# Patient Record
Sex: FEMALE | Race: WHITE | NOT HISPANIC OR LATINO | Employment: PART TIME | URBAN - METROPOLITAN AREA
[De-identification: names, ages, dates, MRNs, and addresses within clinical notes are randomized per-mention and may not be internally consistent; named-entity substitution may affect disease eponyms.]

---

## 2017-06-20 ENCOUNTER — OFFICE VISIT (OUTPATIENT)
Dept: URGENT CARE | Facility: CLINIC | Age: 27
End: 2017-06-20
Payer: COMMERCIAL

## 2017-06-20 VITALS
DIASTOLIC BLOOD PRESSURE: 72 MMHG | WEIGHT: 121.6 LBS | OXYGEN SATURATION: 100 % | RESPIRATION RATE: 16 BRPM | BODY MASS INDEX: 22.38 KG/M2 | HEIGHT: 62 IN | SYSTOLIC BLOOD PRESSURE: 112 MMHG | TEMPERATURE: 97.9 F | HEART RATE: 80 BPM

## 2017-06-20 DIAGNOSIS — R59.0 CERVICAL ADENOPATHY: ICD-10-CM

## 2017-06-20 PROCEDURE — 99203 OFFICE O/P NEW LOW 30 MIN: CPT | Performed by: FAMILY MEDICINE

## 2017-06-20 RX ORDER — PREDNISOLONE ACETATE 10 MG/ML
1 SUSPENSION/ DROPS OPHTHALMIC 4 TIMES DAILY
COMMUNITY
End: 2022-05-19

## 2017-06-20 RX ORDER — DORZOLAMIDE HCL 20 MG/ML
1 SOLUTION/ DROPS OPHTHALMIC 3 TIMES DAILY
COMMUNITY
End: 2018-01-05

## 2017-06-20 ASSESSMENT — ENCOUNTER SYMPTOMS
MYALGIAS: 0
HEADACHES: 0
NAUSEA: 0
ARTHRALGIAS: 0
ABDOMINAL PAIN: 0
SWOLLEN GLANDS: 1
VOMITING: 0
COUGH: 0
FEVER: 0
ANOREXIA: 0
CHILLS: 0
NECK PAIN: 0
VISUAL CHANGE: 0
FATIGUE: 0
SORE THROAT: 0
CHANGE IN BOWEL HABIT: 0
DIZZINESS: 0

## 2017-06-20 NOTE — MR AVS SNAPSHOT
"        Reji Connolly   2017 6:15 PM   Office Visit   MRN: [id number]    Department:  [facility]   Dept Phone:  [phone]    Description:  Female : 1990   Provider:  Jeffery Barnhart M.D.           Reason for Visit     Other x 1 week have swollen lymphnodes, just hurts if she pushes on them, no ST, no cold symptoms       Allergies as of 2017     Allergen Noted Reactions    Methotrexate 2009       Prednisone 2009         Vital Signs     Blood Pressure Pulse Temperature Respirations Height Weight    112/72 mmHg 80 36.6 °C (97.9 °F) 16 1.575 m (5' 2\") 55.157 kg (121 lb 9.6 oz)    Body Mass Index Oxygen Saturation                22.24 kg/m2 100%          Basic Information     Date Of Birth Sex Race Ethnicity Preferred Language    1990 Female White Non- English      Health Maintenance        Date Due Completion Dates    IMM HEP B VACCINE (1 of 3 - Primary Series) 1990 ---    IMM HEP A VACCINE (1 of 2 - Standard Series) 10/16/1991 ---    IMM HPV VACCINE (1 of 3 - Female 3 Dose Series) 10/16/2001 ---    IMM VARICELLA (CHICKENPOX) VACCINE (1 of 2 - 2 Dose Adolescent Series) 10/16/2003 ---    IMM DTaP/Tdap/Td Vaccine (1 - Tdap) 10/16/2009 ---    PAP SMEAR 10/16/2011 ---            Current Immunizations     No immunizations on file.      Below and/or attached are the medications your provider expects you to take. Review all of your home medications and newly ordered medications with your provider and/or pharmacist. Follow medication instructions as directed by your provider and/or pharmacist. Please keep your medication list with you and share with your provider. Update the information when medications are discontinued, doses are changed, or new medications (including over-the-counter products) are added; and carry medication information at all times in the event of emergency situations     Allergies:  METHOTREXATE - (reactions not documented)     PREDNISONE " - (reactions not documented)               Medications  Valid as of: June 20, 2017 -  6:45 PM    Generic Name Brand Name Tablet Size Instructions for use    Brimonidine Tartrate-Timolol   by Ophthalmic route.        Dorzolamide HCl (Solution) TRUSOPT 2 % Place 1 Drop in both eyes 3 times a day.        Ketorolac Tromethamine   by Ophthalmic route.        PrednisoLONE Acetate (Suspension) PRED FORTE 1 % Place 1 Drop in both eyes 4 times a day.        Triamcinolone Acetonide (Cream) KENALOG 0.5 % Apply to skin twice a day, sparingly        .                 Medicines prescribed today were sent to:     [facility] [address], [place] - [address] AT [facility] [address]    [address] [place] [address]    Phone: [phone] Fax: [phone]    Open 24 Hours?: No      Medication refill instructions:       If your prescription bottle indicates you have medication refills left, it is not necessary to call your provider’s office. Please contact your pharmacy and they will refill your medication.    If your prescription bottle indicates you do not have any refills left, you may request refills at any time through one of the following ways: The online [employer] system (except Urgent Care), by calling your provider’s office, or by asking your pharmacy to contact your provider’s office with a refill request. Medication refills are processed only during regular business hours and may not be available until the next business day. Your provider may request additional information or to have a follow-up visit with you prior to refilling your medication.   *Please Note: Medication refills are assigned a new Rx number when refilled electronically. Your pharmacy may indicate that no refills were authorized even though a new prescription for the same medication is available at the pharmacy. Please request the medicine by name with the pharmacy before contacting your provider for a refill.            Adventoris Access Code: [email or web address]  Expires: 7/20/2017  6:45 PM    Adventoris  A secure, online tool to manage your health information     [employer]’s Adventoris® is a secure, online tool that connects you to your personalized health information from the privacy of your home -- day or night - making it very easy for you to manage your healthcare. Once the activation process is completed, you can even access your medical information using the Adventoris sayra, which is available for free in the Apple Sayra store or Google Play store.     Adventoris provides the following levels of access (as shown below):   My Chart Features   [facility] Primary Care Doctor [facility]  Specialists [facility]  Urgent  Care Non-[facility]  Primary Care  Doctor   Email your healthcare team securely and privately 24/7 X X X    Manage appointments: schedule your next appointment; view details of past/upcoming appointments X      Request prescription refills. X      View recent personal medical records, including lab and immunizations X X X X   View health record, including health history, allergies, medications X X X X   Read reports about your outpatient visits, procedures, consult and ER notes X X X X   See your discharge summary, which is a recap of your hospital and/or ER visit that includes your diagnosis, lab results, and care plan. X X       How to register for Adventoris:  1. Go to  [email or web address].  2. Click on the Sign Up Now box, which takes you to the New Member Sign Up page. You will need to provide the following information:  a. Enter your Adventoris Access Code exactly as it appears at the top of this page. (You will not need to use this code after you’ve completed the sign-up process. If you do not sign up before the expiration date, you must request a new code.)   b. Enter your date of birth.   c. Enter your home email address.   d. Click Submit, and follow the next screen’s instructions.  3. Create a Adventoris ID. This will be your Adventoris  login ID and cannot be changed, so think of one that is secure and easy to remember.  4. Create a [employer] password. You can change your password at any time.  5. Enter your Password Reset Question and Answer. This can be used at a later time if you forget your password.   6. Enter your e-mail address. This allows you to receive e-mail notifications when new information is available in [employer].  7. Click Sign Up. You can now view your health information.    For assistance activating your [employer] account, call [phone]

## 2017-06-21 NOTE — PROGRESS NOTES
Subjective:      Reji Connolly is a 26 y.o. female who presents with Other            Other  This is a new problem. The current episode started in the past 7 days (4-5 days). The problem occurs intermittently. The problem has been unchanged. Associated symptoms include swollen glands. Pertinent negatives include no abdominal pain, anorexia, arthralgias, change in bowel habit, chest pain, chills, congestion, coughing, fatigue, fever, headaches, myalgias, nausea, neck pain, rash, sore throat, urinary symptoms, visual change or vomiting. Exacerbated by: movement. She has tried NSAIDs (aleve) for the symptoms. The treatment provided mild relief.       Review of Systems   Constitutional: Negative for fever, chills and fatigue.   HENT: Negative for congestion and sore throat.    Respiratory: Negative for cough.    Cardiovascular: Negative for chest pain.   Gastrointestinal: Negative for nausea, vomiting, abdominal pain, anorexia and change in bowel habit.   Musculoskeletal: Negative for myalgias, arthralgias and neck pain.   Skin: Negative for rash.   Neurological: Negative for dizziness and headaches.     PMH:  has no past medical history on file.  MEDS:   Current outpatient prescriptions:   •  dorzolamide (TRUSOPT) 2 % Solution, Place 1 Drop in both eyes 3 times a day., Disp: , Rfl:   •  prednisoLONE acetate (PRED FORTE) 1 % Suspension, Place 1 Drop in both eyes 4 times a day., Disp: , Rfl:   •  Brimonidine Tartrate-Timolol (COMBIGAN OP), by Ophthalmic route., Disp: , Rfl:   •  KETOROLAC TROMETHAMINE OP, by Ophthalmic route., Disp: , Rfl:   •  triamcinolone acetonide (KENALOG) 0.5 % CREA, Apply to skin twice a day, sparingly, Disp: 45 g, Rfl: 0  ALLERGIES:   Allergies   Allergen Reactions   • Methotrexate    • Prednisone      SURGHX:   Past Surgical History   Procedure Laterality Date   • Eye surgery       cataract in right eye     SOCHX:  reports that she has never smoked. She does not have any smokeless tobacco  "history on file. She reports that she does not drink alcohol or use illicit drugs.  FH: Family history was reviewed, no pertinent findings to report       Objective:     /72 mmHg  Pulse 80  Temp(Src) 36.6 °C (97.9 °F)  Resp 16  Ht 1.575 m (5' 2\")  Wt 55.157 kg (121 lb 9.6 oz)  BMI 22.24 kg/m2  SpO2 100%     Physical Exam   Constitutional: She appears well-developed.   HENT:   Head: Normocephalic.   Right Ear: External ear normal.   Left Ear: External ear normal.   Mouth/Throat: No oropharyngeal exudate.   Mild Nasal congestion    Eyes: Right eye exhibits no discharge. Left eye exhibits no discharge.   Neck: Normal range of motion. No tracheal deviation present. No thyromegaly present.   Cardiovascular: Normal rate.  Exam reveals no friction rub.    No murmur heard.  Pulmonary/Chest: Effort normal. No stridor. No respiratory distress. She has no wheezes.   Abdominal: Soft. She exhibits no distension. There is no tenderness. There is no guarding.   Lymphadenopathy:     She has cervical adenopathy.   Neurological: She is alert.   Skin: Skin is warm and dry. She is not diaphoretic.   Psychiatric: She has a normal mood and affect. Her behavior is normal.               Assessment/Plan:     1. Cervical adenopathy       Improving  Recent laryngitis, mother also has viral upper respiratory infection  Suspect this is related to her recent infection  Continue ibuprofen for the next few days  Follow adenopathy, already improving  Symptoms persist or worsen beyond the next few weeks she should be reevaluated  "

## 2017-09-07 ENCOUNTER — OFFICE VISIT (OUTPATIENT)
Dept: URGENT CARE | Facility: CLINIC | Age: 27
End: 2017-09-07
Payer: COMMERCIAL

## 2017-09-07 VITALS
TEMPERATURE: 98.7 F | SYSTOLIC BLOOD PRESSURE: 114 MMHG | OXYGEN SATURATION: 99 % | HEIGHT: 62 IN | BODY MASS INDEX: 23.55 KG/M2 | DIASTOLIC BLOOD PRESSURE: 70 MMHG | HEART RATE: 104 BPM | WEIGHT: 128 LBS

## 2017-09-07 DIAGNOSIS — S16.1XXA NECK STRAIN, INITIAL ENCOUNTER: ICD-10-CM
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
## 2017-09-07 PROCEDURE — 99214 OFFICE O/P EST MOD 30 MIN: CPT | Performed by: PHYSICIAN ASSISTANT

## 2017-09-07 RX ORDER — CYCLOBENZAPRINE HCL 5 MG
5-10 TABLET ORAL 3 TIMES DAILY PRN
Qty: 20 TAB | Refills: 0 | Status: SHIPPED | OUTPATIENT
Start: 2017-09-07 | End: 2018-01-05

## 2017-09-07 RX ORDER — PILOCARPINE HYDROCHLORIDE 10 MG/ML
1 SOLUTION/ DROPS OPHTHALMIC 4 TIMES DAILY
COMMUNITY
End: 2022-05-19

## 2017-09-08 ASSESSMENT — ENCOUNTER SYMPTOMS
ABDOMINAL PAIN: 0
TROUBLE SWALLOWING: 0
NAUSEA: 0
SYNCOPE: 0
VISUAL CHANGE: 0
DIARRHEA: 0
WEAKNESS: 0
SORE THROAT: 0
COUGH: 0
HEADACHES: 0
CHILLS: 0
VOMITING: 0
PHOTOPHOBIA: 0
WHEEZING: 0
LEG PAIN: 0
BACK PAIN: 0
NUMBNESS: 0
EYE DISCHARGE: 0
FEVER: 0
FALLS: 0
EYE REDNESS: 0
NECK PAIN: 1
TINGLING: 0
SENSORY CHANGE: 0
PARESIS: 0
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
## 2017-12-22 ENCOUNTER — APPOINTMENT (OUTPATIENT)
Dept: MEDICAL GROUP | Facility: MEDICAL CENTER | Age: 27
End: 2017-12-22
Payer: COMMERCIAL

## 2018-01-05 ENCOUNTER — OFFICE VISIT (OUTPATIENT)
Dept: MEDICAL GROUP | Facility: MEDICAL CENTER | Age: 28
End: 2018-01-05
Payer: COMMERCIAL

## 2018-01-05 ENCOUNTER — HOSPITAL ENCOUNTER (OUTPATIENT)
Dept: RADIOLOGY | Facility: MEDICAL CENTER | Age: 28
End: 2018-01-05
Attending: NURSE PRACTITIONER
Payer: COMMERCIAL

## 2018-01-05 VITALS
RESPIRATION RATE: 16 BRPM | HEIGHT: 62 IN | OXYGEN SATURATION: 95 % | WEIGHT: 131 LBS | TEMPERATURE: 97.4 F | HEART RATE: 88 BPM | BODY MASS INDEX: 24.11 KG/M2 | SYSTOLIC BLOOD PRESSURE: 122 MMHG | DIASTOLIC BLOOD PRESSURE: 70 MMHG

## 2018-01-05 DIAGNOSIS — M54.50 ACUTE MIDLINE LOW BACK PAIN WITHOUT SCIATICA: ICD-10-CM

## 2018-01-05 DIAGNOSIS — G93.89 MASS OF BRAIN: ICD-10-CM

## 2018-01-05 DIAGNOSIS — H20.9 UVEITIS: ICD-10-CM

## 2018-01-05 PROCEDURE — 72100 X-RAY EXAM L-S SPINE 2/3 VWS: CPT

## 2018-01-05 PROCEDURE — 99214 OFFICE O/P EST MOD 30 MIN: CPT | Performed by: NURSE PRACTITIONER

## 2018-01-05 RX ORDER — NORGESTIMATE AND ETHINYL ESTRADIOL 0.25-0.035
1 KIT ORAL DAILY
Qty: 28 TAB | Status: SHIPPED | DISCHARGE
Start: 2018-01-05 | End: 2022-05-19

## 2018-01-05 ASSESSMENT — ENCOUNTER SYMPTOMS: BACK PAIN: 1

## 2018-01-05 ASSESSMENT — PATIENT HEALTH QUESTIONNAIRE - PHQ9: CLINICAL INTERPRETATION OF PHQ2 SCORE: 0
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
## 2018-01-17 ENCOUNTER — TELEPHONE (OUTPATIENT)
Dept: MEDICAL GROUP | Facility: MEDICAL CENTER | Age: 28
End: 2018-01-17

## 2018-01-17 DIAGNOSIS — Z00.00 ROUTINE GENERAL MEDICAL EXAMINATION AT A HEALTH CARE FACILITY: ICD-10-CM

## 2018-01-17 NOTE — TELEPHONE ENCOUNTER
1. Caller Name: Reji Connolly                        Call Back Number: [phone]      2. Message: Patient called, she said you ordered some lab work for her and was wondering if you can add to that lab work an order to check her vitamin levels, to see which one she needs to take and to be faxed to [phone] if ordered...thank you    3. Patient approves office to leave a detailed voicemail/MyChart message: N\A

## 2018-01-17 NOTE — TELEPHONE ENCOUNTER
I have ordered vitamin D and vitamin B12 levels which are typically the only vitamin levels that we would check since most vitamin levels are otherwise normal in [place]. Also, I cannot guarantee her insurance will play for routine screening and she should check with her insurance before she has these tests done.

## 2018-01-20 LAB
25(OH)D3+25(OH)D2 SERPL-MCNC: 54.7 NG/ML (ref 30–100)
ALBUMIN SERPL-MCNC: 4.8 G/DL (ref 3.5–5.5)
ALBUMIN/GLOB SERPL: 1.8 {RATIO} (ref 1.2–2.2)
ALP SERPL-CCNC: 44 IU/L (ref 39–117)
ALT SERPL-CCNC: 17 IU/L (ref 0–32)
AST SERPL-CCNC: 28 IU/L (ref 0–40)
BASOPHILS # BLD AUTO: 0 X10E3/UL (ref 0–0.2)
BASOPHILS NFR BLD AUTO: 0 %
BILIRUB SERPL-MCNC: 0.6 MG/DL (ref 0–1.2)
BUN SERPL-MCNC: 10 MG/DL (ref 6–20)
BUN/CREAT SERPL: 15 (ref 9–23)
CALCIUM SERPL-MCNC: 10.2 MG/DL (ref 8.7–10.2)
CHLORIDE SERPL-SCNC: 100 MMOL/L (ref 96–106)
CO2 SERPL-SCNC: 23 MMOL/L (ref 18–29)
CREAT SERPL-MCNC: 0.67 MG/DL (ref 0.57–1)
EOSINOPHIL # BLD AUTO: 0 X10E3/UL (ref 0–0.4)
EOSINOPHIL NFR BLD AUTO: 1 %
ERYTHROCYTE [DISTWIDTH] IN BLOOD BY AUTOMATED COUNT: 12.7 % (ref 12.3–15.4)
GLOBULIN SER CALC-MCNC: 2.7 G/DL (ref 1.5–4.5)
GLUCOSE SERPL-MCNC: 87 MG/DL (ref 65–99)
HCT VFR BLD AUTO: 39.9 % (ref 34–46.6)
HGB BLD-MCNC: 13.8 G/DL (ref 11.1–15.9)
IF AFRICAN AMERICAN  100797: 139 ML/MIN/1.73
IF NON AFRICAN AMER 100791: 121 ML/MIN/1.73
IMM GRANULOCYTES # BLD: 0 X10E3/UL (ref 0–0.1)
IMM GRANULOCYTES NFR BLD: 0 %
IMMATURE CELLS  115398: NORMAL
LYMPHOCYTES # BLD AUTO: 1.6 X10E3/UL (ref 0.7–3.1)
LYMPHOCYTES NFR BLD AUTO: 44 %
MCH RBC QN AUTO: 31.5 PG (ref 26.6–33)
MCHC RBC AUTO-ENTMCNC: 34.6 G/DL (ref 31.5–35.7)
MCV RBC AUTO: 91 FL (ref 79–97)
MONOCYTES # BLD AUTO: 0.3 X10E3/UL (ref 0.1–0.9)
MONOCYTES NFR BLD AUTO: 8 %
MORPHOLOGY BLD-IMP: NORMAL
NEUTROPHILS # BLD AUTO: 1.7 X10E3/UL (ref 1.4–7)
NEUTROPHILS NFR BLD AUTO: 47 %
NRBC BLD AUTO-RTO: NORMAL %
PLATELET # BLD AUTO: 242 X10E3/UL (ref 150–379)
POTASSIUM SERPL-SCNC: 4.6 MMOL/L (ref 3.5–5.2)
PROT SERPL-MCNC: 7.5 G/DL (ref 6–8.5)
RBC # BLD AUTO: 4.38 X10E6/UL (ref 3.77–5.28)
SODIUM SERPL-SCNC: 138 MMOL/L (ref 134–144)
VIT B12 SERPL-MCNC: 283 PG/ML (ref 232–1245)
WBC # BLD AUTO: 3.7 X10E3/UL (ref 3.4–10.8)

## 2018-01-23 ENCOUNTER — HOSPITAL ENCOUNTER (OUTPATIENT)
Dept: RADIOLOGY | Facility: MEDICAL CENTER | Age: 28
End: 2018-01-23
Attending: NURSE PRACTITIONER
Payer: COMMERCIAL

## 2018-01-23 ENCOUNTER — PHYSICAL THERAPY (OUTPATIENT)
Dept: PHYSICAL THERAPY | Facility: REHABILITATION | Age: 28
End: 2018-01-23
Attending: NURSE PRACTITIONER
Payer: COMMERCIAL

## 2018-01-23 VITALS — BODY MASS INDEX: 23.37 KG/M2 | WEIGHT: 127 LBS | HEIGHT: 62 IN

## 2018-01-23 DIAGNOSIS — M54.50 ACUTE LOW BACK PAIN WITHOUT SCIATICA, UNSPECIFIED BACK PAIN LATERALITY: ICD-10-CM

## 2018-01-23 DIAGNOSIS — G93.89 MASS OF BRAIN: ICD-10-CM

## 2018-01-23 PROCEDURE — A9579 GAD-BASE MR CONTRAST NOS,1ML: HCPCS | Performed by: NURSE PRACTITIONER

## 2018-01-23 PROCEDURE — 700117 HCHG RX CONTRAST REV CODE 255: Performed by: NURSE PRACTITIONER

## 2018-01-23 PROCEDURE — 97014 ELECTRIC STIMULATION THERAPY: CPT

## 2018-01-23 PROCEDURE — 70553 MRI BRAIN STEM W/O & W/DYE: CPT

## 2018-01-23 PROCEDURE — 97162 PT EVAL MOD COMPLEX 30 MIN: CPT

## 2018-01-23 PROCEDURE — A9270 NON-COVERED ITEM OR SERVICE: HCPCS | Performed by: RADIOLOGY

## 2018-01-23 PROCEDURE — 700102 HCHG RX REV CODE 250 W/ 637 OVERRIDE(OP): Performed by: RADIOLOGY

## 2018-01-23 PROCEDURE — 97110 THERAPEUTIC EXERCISES: CPT

## 2018-01-23 RX ORDER — DIAZEPAM 5 MG/1
5 TABLET ORAL
Status: COMPLETED | OUTPATIENT
Start: 2018-01-23 | End: 2018-01-23

## 2018-01-23 RX ADMIN — GADODIAMIDE 15 ML: 287 INJECTION INTRAVENOUS at 09:58

## 2018-01-23 RX ADMIN — DIAZEPAM 5 MG: 5 TABLET ORAL at 09:15

## 2018-01-23 ASSESSMENT — PAIN SCALES - GENERAL
PAINLEVEL_OUTOF10: 0
PAINLEVEL_OUTOF10: 0

## 2018-01-23 ASSESSMENT — ENCOUNTER SYMPTOMS
PAIN SCALE AT LOWEST: 0
PAIN SCALE AT HIGHEST: 5
PAIN SCALE: 0
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
## 2018-01-23 NOTE — DISCHARGE INSTRUCTIONS
MRI ADULT DISCHARGE INSTRUCTIONS    You have been medicated today for your scan. Please follow the instructions below to ensure your safe recovery. If you have any questions or problems, feel free to call us at [phone] or [phone].     1.   Have someone stay with you to assist you as needed.    2.   Do not drive or operate any mechanical devices.    3.   Do not perform any activity that requires concentration. Make no major decisions over the next 24 hours.     4.   Be careful changing positions from laying down to sitting or standing, as you may become dizzy.     5.   Do not drink alcohol for 48 hours.    6.   There are no restrictions for eating your normal meals. Drink fluids.    7.   You may continue your usual medications for pain, tranquilizers, muscle relaxants or sedatives when awake.     8.   Tomorrow, you may continue your normal daily activities.     9.   Pressure dressing on 10 - 15 minutes. If swelling or bleeding occurs when removed, continue placing direct pressure on injection site for another 5 minutes, or until bleeding stops.     I have been informed of and understand the above discharge instructions. Diazepam (VALIUM) Oral solution  What is this medicine?  You were prescribed DIAZEPAM (dye AZ e muriel) for the procedure you had today. This medication is a benzodiazepine. It is used to treat anxiety and nervousness. It also can help treat alcohol withdrawal, relax muscles, and treat certain types of seizures.  This medicine may be used for other purposes; ask your health care provider or pharmacist if you have questions.  What side effects may I notice from receiving this medicine?  Side effects that you should report to your doctor or health care professional as soon as possible:  • allergic reactions like skin rash, itching or hives, swelling of the face, lips, or tongue  • angry, confused, depressed, other mood changes  • breathing problems  • feeling faint or lightheaded, falls  • muscle  cramps  • problems with balance, talking, walking  • restlessness  • tremors  • trouble passing urine or change in the amount of urine  • unusually weak or tired  Side effects that usually do not require medical attention (report to your doctor or health care professional if they continue or are bothersome):  • difficulty sleeping, nightmares  • dizziness, drowsiness, clumsiness, or unsteadiness, a hangover effect  • headache  • nausea, vomiting  This list may not describe all possible side effects. Call your doctor for medical advice about side effects. You may report side effects to FDA at 1-491-AGC-7378.
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
## 2018-01-25 ENCOUNTER — APPOINTMENT (OUTPATIENT)
Dept: PHYSICAL THERAPY | Facility: REHABILITATION | Age: 28
End: 2018-01-25
Attending: NURSE PRACTITIONER
Payer: COMMERCIAL

## 2018-01-26 ENCOUNTER — PHYSICAL THERAPY (OUTPATIENT)
Dept: PHYSICAL THERAPY | Facility: REHABILITATION | Age: 28
End: 2018-01-26
Attending: NURSE PRACTITIONER
Payer: COMMERCIAL

## 2018-01-26 DIAGNOSIS — M54.50 ACUTE LOW BACK PAIN WITHOUT SCIATICA, UNSPECIFIED BACK PAIN LATERALITY: ICD-10-CM

## 2018-01-26 PROCEDURE — 97014 ELECTRIC STIMULATION THERAPY: CPT

## 2018-01-26 PROCEDURE — 97110 THERAPEUTIC EXERCISES: CPT

## 2018-01-26 NOTE — OP THERAPY DAILY TREATMENT
Outpatient Physical Therapy  DAILY TREATMENT     [facility] Physical [address]  [address]  [place] [address]  Phone:  [phone]  Fax:  [phone]    Date: 01/26/2018    Patient: Reji Connolly  YOB: 1990  MRN: [id number]     Time Calculation  Start time: 0315  Stop time: 0402 Time Calculation (min): 47 minutes     Chief Complaint: low back pain  Visit #: 2    SUBJECTIVE:  Pt has only mild pain today.     OBJECTIVE:    Therapeutic Exercises (CPT 08106):     Total treatment time spent on Therapeutic Exercises: 26 minutes.      1. Core exercises with cuff: fall outs, marching , hs curls    2. Multifidus lateral stepping , 10x each    3. Wall ball squats    Therapeutic Treatments and Modalities:     1. E Stim Unattended (CPT 54721), 15 minutes, IFC with MHP to L/S     ASSESSMENT:   Pt tolerated exercises well with minimal pain.     PLAN/RECOMMENDATIONS:   Continue to progress core exercises.

## 2018-01-29 ENCOUNTER — APPOINTMENT (OUTPATIENT)
Dept: PHYSICAL THERAPY | Facility: REHABILITATION | Age: 28
End: 2018-01-29
Attending: NURSE PRACTITIONER
Payer: COMMERCIAL

## 2018-01-30 ENCOUNTER — PHYSICAL THERAPY (OUTPATIENT)
Dept: PHYSICAL THERAPY | Facility: REHABILITATION | Age: 28
End: 2018-01-30
Attending: NURSE PRACTITIONER
Payer: COMMERCIAL

## 2018-01-30 DIAGNOSIS — M54.50 ACUTE LOW BACK PAIN WITHOUT SCIATICA, UNSPECIFIED BACK PAIN LATERALITY: ICD-10-CM

## 2018-01-30 PROCEDURE — 97014 ELECTRIC STIMULATION THERAPY: CPT

## 2018-01-30 PROCEDURE — 97110 THERAPEUTIC EXERCISES: CPT

## 2018-01-31 ENCOUNTER — APPOINTMENT (OUTPATIENT)
Dept: PHYSICAL THERAPY | Facility: REHABILITATION | Age: 28
End: 2018-01-31
Attending: NURSE PRACTITIONER
Payer: COMMERCIAL

## 2018-02-05 ENCOUNTER — APPOINTMENT (OUTPATIENT)
Dept: PHYSICAL THERAPY | Facility: REHABILITATION | Age: 28
End: 2018-02-05
Attending: NURSE PRACTITIONER
Payer: MEDICAID

## 2018-02-23 ENCOUNTER — APPOINTMENT (OUTPATIENT)
Dept: PHYSICAL THERAPY | Facility: REHABILITATION | Age: 28
End: 2018-02-23
Attending: NURSE PRACTITIONER
Payer: MEDICAID

## 2018-02-27 ENCOUNTER — PHYSICAL THERAPY (OUTPATIENT)
Dept: PHYSICAL THERAPY | Facility: REHABILITATION | Age: 28
End: 2018-02-27
Attending: NURSE PRACTITIONER
Payer: MEDICAID

## 2018-02-27 DIAGNOSIS — M54.50 ACUTE LOW BACK PAIN WITHOUT SCIATICA, UNSPECIFIED BACK PAIN LATERALITY: ICD-10-CM

## 2018-02-27 PROCEDURE — 97110 THERAPEUTIC EXERCISES: CPT

## 2018-02-27 PROCEDURE — 97140 MANUAL THERAPY 1/> REGIONS: CPT

## 2018-02-27 PROCEDURE — 97014 ELECTRIC STIMULATION THERAPY: CPT

## 2018-02-27 NOTE — OP THERAPY DAILY TREATMENT
"  Outpatient Physical Therapy  DAILY TREATMENT     [facility] Physical Therapy [address].  [address]  [place] [address]  Phone:  [phone]  Fax:  [phone]    Date: 02/27/2018    Patient: Reji Connolly  YOB: 1990  MRN: [id number]     Time Calculation  Start time: 1005  Stop time: 1058 Time Calculation (min): 53 minutes     Chief Complaint: Back Problem    Visit #: 4    SUBJECTIVE:  Patient recently moved from her house to an apartment and she feels that this was actually helpful for her back as she was focusing on her liftin. Shortly after she did notice some increased in her tailbone pain as well as some increase in the sciatic symptoms when she was walking, which is new    OBJECTIVE:  Current objective measures: Patient states that she is not having as much pain with sitting, but did have some discomfort with ambulatory activities this morning for about an hour         Therapeutic Exercises (CPT 97374):     1. Upright bike, L 2 x 5 min     2. TrA activation , + marches +BKFO with PTL x 10 ea     3. Articulated Bridges with ball at knees, 2 x 10     4. PPU , x 10     5. QP Alt Hip Ext , x10 BLE PTB , Minimal discomfort in L SIJ with RLE ext     Therapeutic Treatments and Modalities:     1. E Stim Unattended (CPT 55938), IFC with MHP to L/S     2. Manual Therapy (CPT 00988),  KT tape Two \"I\" strips to erector spinae 25 % tension Origin to Insertion, 1 \"I\" strip parallel to L5 center out stretch for stabilization, Strain Counterstrain L Sacral Point 2 -- Good response     Time-based treatments/modalities:  Manual therapy minutes (CPT 22953): 15 minutes  Therapeutic exercise minutes (CPT 73484): 23 minutes         ASSESSMENT:   Response to treatment: Patient has good response to SCS at L S2 with complete symptom reduction. We advance stabilization from supine to QP and remove piriformis stretch from HEP as she states that this aggravates her symptoms. Cont with KT tape as " she has good response to tx from last session. Instructed with regards to wearing    PLAN/RECOMMENDATIONS:   Plan for treatment: therapy treatment to continue next visit.  Planned interventions for next visit: continue with current treatment. Assess response to SCS and KT tape

## 2018-03-06 ENCOUNTER — PHYSICAL THERAPY (OUTPATIENT)
Dept: PHYSICAL THERAPY | Facility: REHABILITATION | Age: 28
End: 2018-03-06
Attending: NURSE PRACTITIONER
Payer: MEDICAID

## 2018-03-06 DIAGNOSIS — M54.50 ACUTE LOW BACK PAIN WITHOUT SCIATICA, UNSPECIFIED BACK PAIN LATERALITY: ICD-10-CM

## 2018-03-06 PROCEDURE — 97014 ELECTRIC STIMULATION THERAPY: CPT

## 2018-03-06 NOTE — OP THERAPY DAILY TREATMENT
Outpatient Physical Therapy  DAILY TREATMENT     [facility] Physical Therapy [address].  [address]  [place] [address]  Phone:  [phone]  Fax:  [phone]    Date: 03/06/2018    Patient: Reji Connolly  YOB: 1990  MRN: [id number]     Time Calculation  Start time: 0915  Stop time: 1002 Time Calculation (min): 47 minutes     Chief Complaint: low back pain  Visit #: 5    SUBJECTIVE:  Pt feels like the adjustment helped some and the tape was good. I left it on for 2 days.     OBJECTIVE:      Therapeutic Exercises (CPT 45317):     1. Nustep , L 2 x 6min     2. TrA activation , + marches +BKFO with PTL x 10 ea     3. Articulated Bridges with ball at knees, 2 x 10     4. PPU , x 10     5. QP Alt Hip Ext , x10 BLE PTB     6. Wall ball squats, x 30    7. Superman on ball , 2 x 1minute    Therapeutic Treatments and Modalities:     1. E Stim Unattended (CPT 53254), IFC with MHP to L/S     Time-based treatments/modalities:  Therapeutic exercise minutes (CPT 35706): 28 minutes  ASSESSMENT:   Response to treatment: good, needed some cuing for good form.     PLAN/RECOMMENDATIONS:   Plan for treatment: continue to progress core exercises.

## 2018-03-09 ENCOUNTER — PHYSICAL THERAPY (OUTPATIENT)
Dept: PHYSICAL THERAPY | Facility: REHABILITATION | Age: 28
End: 2018-03-09
Attending: NURSE PRACTITIONER
Payer: MEDICAID

## 2018-03-09 DIAGNOSIS — M54.50 ACUTE LOW BACK PAIN WITHOUT SCIATICA, UNSPECIFIED BACK PAIN LATERALITY: ICD-10-CM

## 2018-03-09 PROCEDURE — 97110 THERAPEUTIC EXERCISES: CPT

## 2018-03-09 NOTE — OP THERAPY DAILY TREATMENT
Outpatient Physical Therapy  DAILY TREATMENT     [facility] Physical Therapy [address].  [address]  [place] [address]  Phone:  [phone]  Fax:  [phone]    Date: 03/09/2018    Patient: Reji Connolly  YOB: 1990  MRN: [id number]     Time Calculation  Start time: 0836  Stop time: 0910 Time Calculation (min): 34 minutes     Chief Complaint: Back Problem    Visit #: 6    SUBJECTIVE:  Pt reports overall 70% improvement.  Noted most improvement after SCS treatment and taping 2 sessions ago. Bridges offer the most pain relief at home.  Had pain upon waking, but improved and now 0/10.      OBJECTIVE:    Therapeutic Exercises (CPT 15637):     1. Nustep , L5 x 5 min     2. Soft roller with TA activation, + marches +BKFO x 20 ea     3. Ball bridge, 2 min hold with ball under calves and UEs over chest.     4. PPU and LTR, x 10     5. Wall ball squat with OH lift 8#, x 20    6. TB multifidus pressout, L2 x 15-20 ea    7. Banded side stepping. , L2 x 2 laps      Therapeutic Exercise Summary: Declines IFC as her pain levels are still 0/10 post tx       Time-based treatments/modalities:  Therapeutic exercise minutes (CPT 03243): 34 minutes       ASSESSMENT:   Response to treatment: Advancing functional bracing with good tolerance today.  Gets most pain relief from glute dominant stab work at home.     PLAN/RECOMMENDATIONS:   Plan for treatment: therapy treatment to continue next visit.  Planned interventions for next visit: continue with current treatment.

## 2018-03-13 ENCOUNTER — PHYSICAL THERAPY (OUTPATIENT)
Dept: PHYSICAL THERAPY | Facility: REHABILITATION | Age: 28
End: 2018-03-13
Attending: NURSE PRACTITIONER
Payer: MEDICAID

## 2018-03-13 DIAGNOSIS — M54.50 ACUTE LOW BACK PAIN WITHOUT SCIATICA, UNSPECIFIED BACK PAIN LATERALITY: ICD-10-CM

## 2018-03-13 PROCEDURE — 97110 THERAPEUTIC EXERCISES: CPT

## 2018-03-13 NOTE — OP THERAPY DAILY TREATMENT
Outpatient Physical Therapy  DAILY TREATMENT     [facility] Physical [address].  [address]  [place] [address]  Phone:  [phone]  Fax:  [phone]    Date: 03/13/2018    Patient: Reji Connolly  YOB: 1990  MRN: [id number]     Time Calculation  Start time: 0130  Stop time: 0210 Time Calculation (min): 40 minutes     Chief Complaint: low back pain  Visit #: 7    SUBJECTIVE:  I'm feeling pretty good today. Pt had some mm soreness after last visit, but felt the exercises were good. 0/10  Pain today.     OBJECTIVE:    Therapeutic Exercises (CPT 95299):     1.  treadmill 6 min at 2.2mph     2. TrA activation , + marches +BKFO with PTL x 10 ea     3. Bridge with ball and hip flexion , 2 x 10     4. PPU , x 10     5. Bridge with hs curls, 3 x 12    6. Wall ball squats, x 30    7. Superman on ball , 2 x 1minute    8. Core exercises on the roller: fall outs, leg raises and hs curls     9. Mini squats with tband multifidus static, 2 x 10    10. Shuttle 4c , 40x    Therapeutic Treatments and Modalities:     1. E Stim Unattended (CPT 42569), IFC with MHP to L/S     MHP 8 minutes post treatment. Pt wanted IFC but didn't have time.  Time-based treatments/modalities:    Therapeutic exercise minutes (CPT 76803): 40 minutes       ASSESSMENT:   Response to treatment: excellent. Pt able to demonstrate good form with exercises.     PLAN/RECOMMENDATIONS:     Continue to progress core exercises.

## 2018-03-16 ENCOUNTER — PHYSICAL THERAPY (OUTPATIENT)
Dept: PHYSICAL THERAPY | Facility: REHABILITATION | Age: 28
End: 2018-03-16
Attending: NURSE PRACTITIONER
Payer: MEDICAID

## 2018-03-16 DIAGNOSIS — M54.50 ACUTE LOW BACK PAIN WITHOUT SCIATICA, UNSPECIFIED BACK PAIN LATERALITY: ICD-10-CM

## 2018-03-16 PROCEDURE — 97110 THERAPEUTIC EXERCISES: CPT

## 2018-03-16 PROCEDURE — 97014 ELECTRIC STIMULATION THERAPY: CPT

## 2018-03-16 NOTE — OP THERAPY DAILY TREATMENT
Outpatient Physical Therapy  DAILY TREATMENT     [facility] Physical Therapy [address].  [address]  [place] [address]  Phone:  [phone]  Fax:  [phone]    Date: 03/16/2018    Patient: Reji Connolly  YOB: 1990  MRN: [id number]     Time Calculation  Start time: 0145  Stop time: 0232 Time Calculation (min): 47 minutes     Chief Complaint: low back pain  Visit #: 8    SUBJECTIVE:  I'm feeling good with no pain.     OBJECTIVE:    Therapeutic Exercises (CPT 18506):     1.  treadmill 6 min at 2.2mph     2. Shuttle 5c, 40x    3. Bridge with ball and hip flexion , 2 x 10     4. Ball walk outs, x 10     5. Bridge with hs curls, 3 x 12    6. Wall ball squats, x 30    7. Superman on ball , 2 x 1minute    8. Core exercises on the roller: fall outs, leg raises and hs curls     9. Mini squats with tband multifidus static, 2 x 10    Therapeutic Treatments and Modalities:     1. E Stim Unattended (CPT 47807), IFC with MHP to L/S     Time-based treatments/modalities:  Therapeutic exercise minutes (CPT 70211): 31 minutes    ASSESSMENT:   Response to treatment: pt's doing excellent overall.     PLAN/RECOMMENDATIONS:   Plan for treatment: progress core ex's, dc to hep in 2 visits.

## 2018-03-20 ENCOUNTER — PHYSICAL THERAPY (OUTPATIENT)
Dept: PHYSICAL THERAPY | Facility: REHABILITATION | Age: 28
End: 2018-03-20
Attending: NURSE PRACTITIONER
Payer: MEDICAID

## 2018-03-20 DIAGNOSIS — M54.50 ACUTE LOW BACK PAIN WITHOUT SCIATICA, UNSPECIFIED BACK PAIN LATERALITY: ICD-10-CM

## 2018-03-20 PROCEDURE — 97014 ELECTRIC STIMULATION THERAPY: CPT

## 2018-03-20 PROCEDURE — 97110 THERAPEUTIC EXERCISES: CPT

## 2018-03-20 NOTE — OP THERAPY DAILY TREATMENT
Outpatient Physical Therapy  DAILY TREATMENT     [facility] Physical Therapy [address].  [address]  [place] [address]  Phone:  [phone]  Fax:  [phone]    Date: 03/20/2018    Patient: Reji Connolly  YOB: 1990  MRN: [id number]     Time Calculation  Start time: 0100  Stop time: 0146 Time Calculation (min): 46 minutes     Chief Complaint: back pain  Visit #: 9    SUBJECTIVE:   im feeling pretty good today 1/10 pain.     OBJECTIVE:      Therapeutic Exercises (CPT 29182):     1. Elliptical 4 minutes    2. Shuttle 4c , 40x    3. Bridge with ball and hip flexion , 2 x 10     4. PPU , x 10     5. Bridge with hs curls, 3 x 12    6. Wall ball squats, x 30    7. Superman on ball , 2 x 1minute    8. Foam roller ex's for core: hs curls, fall outs, 3 x 10    9. Lateral stepping with tband , 2 x 60 feet    Therapeutic Treatments and Modalities:     1. E Stim Unattended (CPT 77124), IFC with MHP to L/S     Time-based treatments/modalities:  Therapeutic exercise minutes (CPT 39072): 32 minutes     ASSESSMENT:   Pt is progressing nicely overall.     PLAN/RECOMMENDATIONS:   Dc to home program soon.

## 2018-03-23 ENCOUNTER — APPOINTMENT (OUTPATIENT)
Dept: PHYSICAL THERAPY | Facility: REHABILITATION | Age: 28
End: 2018-03-23
Attending: NURSE PRACTITIONER
Payer: MEDICAID

## 2018-03-27 ENCOUNTER — APPOINTMENT (OUTPATIENT)
Dept: PHYSICAL THERAPY | Facility: REHABILITATION | Age: 28
End: 2018-03-27
Attending: NURSE PRACTITIONER
Payer: MEDICAID

## 2018-03-30 ENCOUNTER — APPOINTMENT (OUTPATIENT)
Dept: PHYSICAL THERAPY | Facility: REHABILITATION | Age: 28
End: 2018-03-30
Attending: NURSE PRACTITIONER
Payer: MEDICAID

## 2018-06-12 ENCOUNTER — HOSPITAL ENCOUNTER (OUTPATIENT)
Dept: HOSPITAL 8 - CFH | Age: 28
Discharge: HOME | End: 2018-06-12
Attending: PSYCHIATRY & NEUROLOGY
Payer: MEDICAID

## 2018-06-12 DIAGNOSIS — G43.109: Primary | ICD-10-CM

## 2018-06-12 PROCEDURE — 70543 MRI ORBT/FAC/NCK W/O &W/DYE: CPT

## 2018-06-12 PROCEDURE — 70553 MRI BRAIN STEM W/O & W/DYE: CPT

## 2018-06-12 PROCEDURE — A9585 GADOBUTROL INJECTION: HCPCS

## 2019-01-04 ENCOUNTER — TELEPHONE (OUTPATIENT)
Dept: PHYSICAL THERAPY | Facility: MEDICAL CENTER | Age: 29
End: 2019-01-04

## 2019-01-04 NOTE — LETTER
January 4, 2019    No Recipients      Re:  Reji Connolly          Dear  No Recipients,    It was a pleasure seeing your patient, Reji Connolly, on 1/4/2019 for her final therapy visit.     Please find enclosed a copy of the patient's discharge summary from outpatient physical therapy services.          On behalf of the staff at [facility], we would like to thank you for your referrals.  We appreciate your confidence in our clinic and will continue to work hard to provide the best outcomes for your patients.    We sincerely enjoy treating your patients and hope that you have been happy with their care.  Thank you again, and please call with any questions, concerns or ways that we can best meet your needs.        Sincerely,          Ani Wagoner, PT, DPT    No Recipients              Outpatient Physical Therapy  DISCHARGE SUMMARY NOTE      [facility] Outpatient Physical Therapy  [address]  [place] [address]  Phone:  [phone]  Fax:  [phone]    Date of Visit: 01/04/2019    Patient: Reji Connolly  YOB: 1990  MRN: [id number]     Referring Provider:  FLORENCIO Lopez    Referring Diagnosis  Acute low back pain without sciatica, unspecified back pain laterality M54.5      Physical Therapy Occurrence Codes    Date of onset of impairment:  12/15/17   Date physical therapy care plan established or reviewed:  1/23/18   Date physical therapy treatment started:  1/23/18          Your patient is being discharged from Physical Therapy with the following comments:   · Goals partially met  · Patient has failed to schedule or reschedule follow-up visits    Comments:  Pt was last seen on 3/20/18 for her low back pain; she had nearly met all initially established goals and was ready for D/C. She did not return to have a formal discharge completed, but her episode of care will be discharged at this time.       Recommendations:  Please refer this patient back if any further needs arise. Thank you.    Ani Wagoner, PT, DPT    Date: 1/4/2019   2017 17:50

## 2019-01-05 NOTE — OP THERAPY DISCHARGE SUMMARY
Outpatient Physical Therapy  DISCHARGE SUMMARY NOTE      [facility]  [address]  [place] [address]  Phone:  [phone]  Fax:  [phone]    Date of Visit: 01/04/2019    Patient: Reji Connolly  YOB: 1990  MRN: [id number]     Referring Provider:  FLORENCIO Lopez    Referring Diagnosis  Acute low back pain without sciatica, unspecified back pain laterality M54.5      Physical Therapy Occurrence Codes    Date of onset of impairment:  12/15/17   Date physical therapy care plan established or reviewed:  1/23/18   Date physical therapy treatment started:  1/23/18          Your patient is being discharged from Physical Therapy with the following comments:   · Goals partially met  · Patient has failed to schedule or reschedule follow-up visits    Comments:  Pt was last seen on 3/20/18 for her low back pain; she had nearly met all initially established goals and was ready for D/C. She did not return to have a formal discharge completed, but her episode of care will be discharged at this time.      Recommendations:  Please refer this patient back if any further needs arise. Thank you.    Ani Wagoner, PT, DPT    Date: 1/4/2019

## 2019-03-27 ENCOUNTER — HOSPITAL ENCOUNTER (EMERGENCY)
Facility: MEDICAL CENTER | Age: 29
End: 2019-03-27
Attending: EMERGENCY MEDICINE
Payer: MEDICAID

## 2019-03-27 VITALS
HEIGHT: 62 IN | RESPIRATION RATE: 16 BRPM | TEMPERATURE: 97.7 F | HEART RATE: 72 BPM | OXYGEN SATURATION: 99 % | SYSTOLIC BLOOD PRESSURE: 111 MMHG | DIASTOLIC BLOOD PRESSURE: 85 MMHG | WEIGHT: 130.29 LBS | BODY MASS INDEX: 23.98 KG/M2

## 2019-03-27 DIAGNOSIS — Z77.098 CHEMICAL EXPOSURE: ICD-10-CM

## 2019-03-27 DIAGNOSIS — Z77.098 EXPOSURE TO CHEMICAL INHALATION: ICD-10-CM

## 2019-03-27 PROCEDURE — 99283 EMERGENCY DEPT VISIT LOW MDM: CPT

## 2019-03-27 NOTE — ED NOTES
Discharge and f/u instructions discussed and understanding verbalized.  Ambulatory out of ED.  School note given.
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
## 2019-03-27 NOTE — ED PROVIDER NOTES
"ED Provider Note    CHIEF COMPLAINT  Chief Complaint   Patient presents with   • Chemical Exposure       HPI  Reji Connolly is a 28 y.o. female who presents to medical exposure to bug balm about 36 hours ago.  The patient had a bug balm because she had an anti-infestation.  She went back into the kitchen after about 4 hours.  She has some burning to her nose or throat.  She has had that since then.  No fevers no chills no difficulties breathing at all no wheezing.  Nothing makes her better or worse.  She is come to the emergency room now.      REVIEW OF SYSTEMS  CONSTITUTIONAL:  Denies fever, chills,   EYES:  Denies photophobia or discharge   ENT: Patient has burning in her nose posterior pharynx a little bit in her trachea when she breathes ever since she breathes in the bug balm night before last  CARDIOVASCULAR:  Denies chest pain, palpitations or swelling  RESPIRATORY:  Denies cough or shortness of breath or difficulty breathing  GI:  Denies abdominal pain,  MUSCULOSKELETAL:  Denies weakness   SKIN:  No rash  ALLERGIC: No itchy rashes.  NEUROLOGIC:  Denies headache    PAST MEDICAL HISTORY  History reviewed. No pertinent past medical history.    FAMILY HISTORY  Family History   Problem Relation Age of Onset   • No Known Problems Mother    • Heart Disease Father    • Hypertension Father    • Hyperlipidemia Father        SOCIAL HISTORY   reports that she has never smoked. She has never used smokeless tobacco. She reports that she does not drink alcohol or use drugs.    SURGICAL HISTORY  Past Surgical History:   Procedure Laterality Date   • EYE SURGERY      cataract in right eye       CURRENT MEDICATIONS  None    ALLERGIES  Allergies   Allergen Reactions   • Methotrexate    • Neosporin Pain Relieving    • Prednisone        PHYSICAL EXAM  VITAL SIGNS: /81   Pulse 90   Temp 36.5 °C (97.7 °F) (Temporal)   Resp 15   Ht 1.575 m (5' 2\")   Wt 59.1 kg (130 lb 4.7 oz)   SpO2 100%   BMI 23.83 kg/m²  "     Constitutional: Patient is awake alert person place and time. No acute respiratory distress Well developed, Well nourished, Non-toxic appearance.   HENT: Normocephalic, Atraumatic,  Bilateral external ears normal, Oropharynx pink moist with no exudates, Nose patent.  I see no signs of swelling or edema to the posterior pharynx her nostrils at all.  There is no stridor.  Eyes: Sclera and conjunctiva clear, No discharge.   Neck:  Supple no nuchal rigidity, Non-tender  Cardiovascular: Heart is regular rate and rhythm no murmur  Thorax & Lungs: Chest is symmetrical, with good breath sounds. No wheezing or crackles. No respiratory distress  Skin: Warm, Dry, no petechia, purpura or rash.  See no facial burns or irritation to her mucous membranes  Musculoskeletal: Good range of motion upper and lower extremities.      COURSE & MEDICAL DECISION MAKING  Pertinent Labs & Imaging studies reviewed. (See chart for details)  She is exposed to bug balm IV for last.  Says little burning to her throat nose and lungs.  She is not show any signs of sludge.  At this point time I think is more of a chemical irritant that is causing her discomfort although at this point I think that simply time will make things better.  I see no redo a chest x-ray blood work.  Again at this time I believe she stable for discharge home for close follow-up as an outpatient.      FINAL IMPRESSION  1.  Chemical exposure inhalant to bug balm more than 36 hours ago  2.  Callosal membranes chemical irritations inhalation       PLAN  1.  Avoid any inhalation injuries  2.   up with her primary care doctor in 2  days    Electronically signed by: Kyle Rosario, 3/27/2019 11:26 AM

## 2020-11-06 ENCOUNTER — APPOINTMENT (OUTPATIENT)
Dept: DERMATOLOGY | Facility: IMAGING CENTER | Age: 30
End: 2020-11-06

## 2021-09-03 ENCOUNTER — APPOINTMENT (OUTPATIENT)
Dept: DERMATOLOGY | Facility: IMAGING CENTER | Age: 31
End: 2021-09-03

## 2022-05-19 ENCOUNTER — APPOINTMENT (OUTPATIENT)
Dept: INTERNAL MEDICINE | Facility: OTHER | Age: 32
End: 2022-05-19
Payer: MEDICAID

## 2022-05-19 PROBLEM — H40.43X1: Status: ACTIVE | Noted: 2018-10-19

## 2022-05-19 PROBLEM — H26.221: Status: ACTIVE | Noted: 2017-01-04

## 2022-05-19 PROBLEM — E55.9 VITAMIN D DEFICIENCY, UNSPECIFIED: Status: ACTIVE | Noted: 2019-10-18

## 2022-05-19 PROBLEM — M67.432 GANGLION CYST OF DORSUM OF LEFT WRIST: Status: ACTIVE | Noted: 2022-05-19

## 2022-05-19 PROBLEM — M67.439 GANGLION OF WRIST: Status: ACTIVE | Noted: 2021-08-26

## 2022-05-19 PROBLEM — Z15.89 HLA B27 (HLA B27 POSITIVE): Status: ACTIVE | Noted: 2022-05-19

## 2022-05-19 PROBLEM — H20.9: Status: ACTIVE | Noted: 2017-01-04

## 2022-05-19 RX ORDER — PREDNISOLONE ACETATE 10 MG/ML
1 SUSPENSION/ DROPS OPHTHALMIC DAILY
COMMUNITY
Start: 2022-03-18 | End: 2023-04-21

## 2022-05-20 ENCOUNTER — OFFICE VISIT (OUTPATIENT)
Dept: INTERNAL MEDICINE | Facility: OTHER | Age: 32
End: 2022-05-20
Payer: MEDICAID

## 2022-05-20 VITALS
HEIGHT: 62 IN | OXYGEN SATURATION: 99 % | DIASTOLIC BLOOD PRESSURE: 82 MMHG | WEIGHT: 148 LBS | TEMPERATURE: 98.8 F | SYSTOLIC BLOOD PRESSURE: 122 MMHG | BODY MASS INDEX: 27.23 KG/M2 | HEART RATE: 90 BPM

## 2022-05-20 DIAGNOSIS — R53.82 CHRONIC FATIGUE: ICD-10-CM

## 2022-05-20 DIAGNOSIS — R07.9 CHEST PAIN, UNSPECIFIED TYPE: ICD-10-CM

## 2022-05-20 PROCEDURE — 99213 OFFICE O/P EST LOW 20 MIN: CPT | Mod: GE

## 2022-05-20 ASSESSMENT — PATIENT HEALTH QUESTIONNAIRE - PHQ9
5. POOR APPETITE OR OVEREATING: 2 - MORE THAN HALF THE DAYS
SUM OF ALL RESPONSES TO PHQ QUESTIONS 1-9: 9
CLINICAL INTERPRETATION OF PHQ2 SCORE: 1

## 2022-05-20 NOTE — PATIENT INSTRUCTIONS
-Follow-up in 2 weeks to go over lab results  -Get lab work 1-2 weeks prior to next appoinment  -For the chest pain/tightness, please keep a diary of symptoms and bring next appointment  -If chest pain is severe, please visit urgent care to get work-up done   -When you do move, please establish with primary care provider  -It was nice visiting with you today!

## 2022-05-20 NOTE — PROGRESS NOTES
Established Patient    Patient Care Team:  Everette Wilson M.D. as PCP - General (Internal Medicine)  Ani Wagoner, PT, DPT (Inactive) as Physical Therapist (Physical Therapy)  Susan Carreno, PT (Inactive) as Physical Therapist (Physical Therapy)    Reji Connolly is a 31 y.o. female who presents today with the following Chief Complaint(s): Follow up for Diagnoses of Fatigue, unspecified type and Chest pain, unspecified type were pertinent to this visit.    HPI:  Patient is a 31-year-old female with a past medical history of JRA with uveitis, bilateral glaucoma who presents to the clinic today with complaints of chronic fatigue and chest pain.    Patient states the fatigue has been ongoing for 6 months and is constant throughout the day.  Described the fatigue as having no energy and feeling exhausted.  States when the symptoms started she was in a really bad living situation and eventually moved out.  During this time she was seeing a counselor. Today, the fatigue has improved, but still there. Denies any shortness of breath, weight gain, pallor, constipation, lower extremity swelling or cold intolerance.     Also, has been having chest pain. Last time she experienced it was 3 weeks ago. She describes the pain as shooting pains with tightness. It does not radiate. Denies it is exertional/ States it will just happen. She denies any current anxiety. Denies any fatigue, nausea, vomiting, palpitations, shortness of breath or numbness/tingling.  Does drink 2 cups of coffee daily.       ROS:     General: No fevers, chills, night sweats, weight loss or gain  HEENT: No hearing changes, vision changes, eye pain, ear pain, nasal discharge, sore throat  Neck: No swelling in neck  Pulmonary: No shortness of breath, cough, sputum, or hemoptysis  Cardiovascular: No chest pain, palpitations, or LE swelling  GI: No nausea, vomiting, diarrhea, constipation, abdominal pain, hematochezia or melena  : No dysuria  "or frequency  Neuro: No focal weakness, no general weakness, no headaches, no lightheadedness, no dizziness  Psych: No anxiety or depression    History reviewed. No pertinent past medical history.  Social History     Tobacco Use   • Smoking status: Never Smoker   • Smokeless tobacco: Never Used   Substance Use Topics   • Alcohol use: Yes     Comment: occassional   • Drug use: No     Current Outpatient Medications   Medication Sig Dispense Refill   • prednisoLONE acetate (PRED FORTE) 1 % Suspension Administer 1 Drop into affected eye(s) every day. Instill 1 drop into the LEFT eye every day       No current facility-administered medications for this visit.       Physical Exam:  /82 (BP Location: Right arm, Patient Position: Sitting, BP Cuff Size: Adult)   Pulse 90   Temp 37.1 °C (98.8 °F) (Temporal)   Ht 1.575 m (5' 2\")   Wt 67.1 kg (148 lb)   SpO2 99%   BMI 27.07 kg/m²   General: Well developed, well nourished female, in no distress.  HEENT: NC/AT, no scleral icterus or conjunctival pallor  Neck: Supple, No cervical or supraclavicular LAD  CV:RRR, no murmurs gallops or Rubs, no JVD  Pulm: LCAB, no crackles, rales, rhonchi, or wheezing  GI: Normal bowel sounds, abdomen soft, nontender, nondistended to deep or light palpation in all 4 quadrants.  MSK: Radial and dorsalis pedis pulses 2+ and equal bilaterally, respectively.  Strength 5 out of 5 in upper and lower extremities.  No lower extremity edema  Neuro: Patient is alert and oriented x3, no focal deficits  Psych: Appropriate mood and affect       Assessment and Plan:   1. Fatigue, unspecified type  Chronic fatigue, 6 months  PHQ-9 score 9  Likely due to emotional stress vs organic cause (anemia, electrolyte imbalance, hypothyroid)  - CBC WITH DIFFERENTIAL; Future  - Comp Metabolic Panel; Future  - TSH+FREE T4  -If patient moves prior to next appointment, discussed with her to establish with primary care provider to follow-up   -Follow-up In 2 weeks to " go over lab results    2. Chest pain, unspecified type  Likely MSK vs anxiety  -Discussed with patient due to not having chest pain at this moment, she should keep a diary of her symptoms and bring it in to the next appointment  -If chest pain ever severe, discussed with patient to go to the ED  -Follow-up in 2 weeks        Return in about 2 weeks (around 6/3/2022).    Patient Instructions   -Follow-up in 2 weeks to go over lab results  -Get lab work 1-2 weeks prior to next appoinment  -For the chest pain/tightness, please keep a diary of symptoms and bring next appointment  -If chest pain is severe, please visit urgent care to get work-up done   -When you do move, please establish with primary care provider  -It was nice visiting with you today!       Jeimy Martinez M.D. PGY-1  [facility]    This note was created using voice recognition software.  While every attempt is made to ensure accuracy of transcription, occasionally errors occur.

## 2022-06-10 DIAGNOSIS — R07.9 CHEST PAIN, UNSPECIFIED TYPE: ICD-10-CM

## 2022-06-10 DIAGNOSIS — R53.82 CHRONIC FATIGUE: ICD-10-CM

## 2022-07-01 ENCOUNTER — TELEMEDICINE (OUTPATIENT)
Dept: INTERNAL MEDICINE | Facility: OTHER | Age: 32
End: 2022-07-01
Payer: MEDICAID

## 2022-07-01 VITALS — WEIGHT: 148 LBS | HEIGHT: 62 IN | BODY MASS INDEX: 27.23 KG/M2

## 2022-07-01 DIAGNOSIS — R53.83 FATIGUE, UNSPECIFIED TYPE: ICD-10-CM

## 2022-07-01 PROCEDURE — 99213 OFFICE O/P EST LOW 20 MIN: CPT | Mod: GT,GE | Performed by: INTERNAL MEDICINE

## 2022-07-01 ASSESSMENT — ENCOUNTER SYMPTOMS
BLURRED VISION: 0
COUGH: 0
HEARTBURN: 0
CHILLS: 0
MYALGIAS: 0
TINGLING: 0
NECK PAIN: 0
NAUSEA: 0
SPUTUM PRODUCTION: 0
DOUBLE VISION: 0
VOMITING: 0
WEIGHT LOSS: 0
FEVER: 0
BACK PAIN: 0
DIZZINESS: 0
HEMOPTYSIS: 0
PALPITATIONS: 0
HEADACHES: 0

## 2022-07-01 NOTE — PROGRESS NOTES
This evaluation was conducted via Zoom using secure and encrypted videoconferencing technology. The patient was in their home in the [facility].    The patient's identity was confirmed and verbal consent was obtained for this virtual visit.        CC: Follow up visit regarding fatigue     HPI:   Patient is a 31-year-old female with a past medical history of JRA with uveitis, bilateral glaucoma who presents today for a follow up visit regarding fatigue and to discuss lab work ordered at the last visit.  CBC, CMP and TSH w/T4 were ordered at the last visit to r/o reversible causes of fatigue like anemia, hypothyroidism, electrolyte imbalances.     Patient describes that she has been doing well. She is still pretty fatigued, however it is better than the last time she was seen here.  Discussed the ongoing life stressors with her and she described that she has been overwhelmed and anxious about moving to [place].  Her living situation is much better now, however, just the moving portion has been a little stressful and overwhelming for her.    She has not had any recurrent episode of chest pain.     Denies any nausea, vomiting, palpitations, shortness of breath, numbness/tingling. Denies any weight gain, pallor, constipation, lower extremity swelling, heat or cold intolerance.    Saw her eye doctor with [facility] about 2 days ago and everything looked good.  Denies any flashes or floaters in her vision.      Health Maintenance: Completed    ROS:  Review of Systems   Constitutional: Positive for malaise/fatigue. Negative for chills, fever and weight loss.   HENT: Negative for hearing loss and tinnitus.    Eyes: Negative for blurred vision and double vision.   Respiratory: Negative for cough, hemoptysis and sputum production.    Cardiovascular: Negative for chest pain and palpitations.   Gastrointestinal: Negative for heartburn, nausea and vomiting.   Genitourinary: Negative for dysuria and urgency.  "  Musculoskeletal: Negative for back pain, myalgias and neck pain.   Skin: Negative for rash.   Neurological: Negative for dizziness, tingling and headaches.       Objective:     Exam:  Ht 1.575 m (5' 2\")   Wt 67.1 kg (148 lb)   BMI 27.07 kg/m²  Body mass index is 27.07 kg/m².    Physical Exam  This was a virtual visit.  Therefore complete physical exam was not possible    A chaperone was offered to the patient during today's exam. Chaperone name: Dr. Kenny  was present.    Labs: Reviewed    Assessment & Plan:     31 y.o. female with a past medical history of JRA with uveitis, bilateral glaucoma who presents to the clinic today for a follow up visit.       ##Fatigue, unspecified type   Ongoing, chronic fatigue with symptoms improved compared to the last visit.  Labs reviewed with patient including CBC, CMP and TSH that were within normal limits.  -Discussed iron supplementation (OTC) every other day   -Importance of counseling services discussed with the patient after she moves to [place]   -Recommend discussing about the importance of further rheumatologic workup (r/o other rheumatologic conditions that may contribute to fatigue) in the setting of JRA     Discussed at length the importance of establishing with a new primary care doctor as soon as possible (Patient is moving to [place] on the 7th of this month), regular rheumatology follow up (further labs/investigations to r/o rheumatologic conditions contributing to fatigue) and the importance of counseling services was also discussed at length with her.           I spent a total of 30 minutes with record review, exam, communication with the patient, communication with other providers, and documentation of this encounter.      No follow-ups on file.    Please note that this dictation was created using voice recognition software. I have made every reasonable attempt to correct obvious errors, but I expect that there are errors of grammar and possibly " content that I did not discover before finalizing the note.

## 2024-11-19 NOTE — OP THERAPY DAILY TREATMENT
"  Outpatient Physical Therapy  DAILY TREATMENT     [facility] [address]  [address]  [place] [address]  Phone:  [phone]  Fax:  [phone]    Date: 01/30/2018    Patient: Reji Connolly  YOB: 1990  MRN: [id number]     Time Calculation  Start time: 1350  Stop time: 1435 Time Calculation (min): 45 minutes     Chief Complaint: Low Back Pain    Visit #: 3    SUBJECTIVE:  Pt reports sitting more often and slight increase in sciatica symptoms.     OBJECTIVE:  Current objective measures: decreased core strength, decreased postural awareness         Therapeutic Exercises (CPT 09532):     Total treatment time spent on Therapeutic Exercises: 25 minutes.      1. Piriformis stretch, 2x30\"    2. TrA activation , + marches +BKFO    3. Bridges with ALT LE ext, 5x10    4. FOAM ROLLER, ALT OH reach, serratus punches, bilateral UE OH reach, scap retraction, snow angels; marches x20    5. PPU x10    6. Bilat LE extension in prone, **pain      Therapeutic Exercise Summary: Pt given foam roll H/O with the above listed exercises    Therapeutic Treatments and Modalities:     1. E Stim Unattended (CPT 64600), 15 minutes, IFC with MHP to L/S     2. Manual Therapy (CPT 65724), 5 minutes, IASTM bilateral L/S paraspinals/multifidus- KT to improve postural awareness      Pain rating before treatment: 6  Pain rating after treatment: 3    ASSESSMENT:   Response to treatment: Pt reports relief of symptoms with bridging exercises; no reproduction of pain with additional exercises today. Pt continues to demo decreased core strength during attempts to maintain neutral pelvis and isolate TrA. Will reassess benefit/response to Christianne protocol. Pt will benefit from continuing skilled PT to improve core stability and enable return to PLOF for ADL/exercise.    PLAN/RECOMMENDATIONS:   Plan for treatment: therapy treatment to continue next visit.  Planned interventions for next visit: E-stim " Regard[place]o female, chest pain, arm, neck and face pain as well as ears  ----- Message from [employer] sent at 2024 12:43 PM CST -----  Patient Name: Coral Vaca    Specialist or PCP Name: Feliberto Robert    Symptoms: 46yo female, chest pain, arm, neck and face pain as well as ears    Pregnant (females aged 13-60. If Yes, how long?) : na    Call Back # : [phone]    Which State are you currently located in?: [place]     Name of Clinic Site / Acct# : [facility]     Call arrived during: Work Hours    ----- Message from [employer] sent at 2024 12:43 PM CST -----  Patient Name: Coral Vaca    Specialist or PCP Name: Feliberto Robert    Symptoms: 46yo female, chest pain, arm, neck and face pain as well as ears    Pregnant (females aged 13-60. If Yes, how long?) : na    Call Back # : [phone]    Which State are you currently located in?: [place]     Name of Clinic Site / Acct# : [facility]     Call arrived during: Work Hours     unattended (CPT 62193), hot/cold pack therapy (CPT 73119), manual therapy (CPT 53376), neuromuscular re-education (CPT 03954) and therapeutic exercise (CPT 61967).